# Patient Record
Sex: FEMALE | Race: WHITE | NOT HISPANIC OR LATINO | Employment: OTHER | URBAN - METROPOLITAN AREA
[De-identification: names, ages, dates, MRNs, and addresses within clinical notes are randomized per-mention and may not be internally consistent; named-entity substitution may affect disease eponyms.]

---

## 2021-04-22 ENCOUNTER — OFFICE VISIT (OUTPATIENT)
Dept: URGENT CARE | Facility: PHYSICIAN GROUP | Age: 41
End: 2021-04-22
Payer: MEDICARE

## 2021-04-22 VITALS
RESPIRATION RATE: 16 BRPM | SYSTOLIC BLOOD PRESSURE: 130 MMHG | OXYGEN SATURATION: 100 % | WEIGHT: 180 LBS | HEIGHT: 66 IN | BODY MASS INDEX: 28.93 KG/M2 | DIASTOLIC BLOOD PRESSURE: 72 MMHG | HEART RATE: 76 BPM | TEMPERATURE: 98.6 F

## 2021-04-22 DIAGNOSIS — G89.29 CHRONIC ABDOMINAL PAIN: ICD-10-CM

## 2021-04-22 DIAGNOSIS — J01.40 ACUTE NON-RECURRENT PANSINUSITIS: ICD-10-CM

## 2021-04-22 DIAGNOSIS — R10.9 CHRONIC ABDOMINAL PAIN: ICD-10-CM

## 2021-04-22 DIAGNOSIS — J30.2 CHRONIC SEASONAL ALLERGIC RHINITIS: ICD-10-CM

## 2021-04-22 PROBLEM — F33.42 RECURRENT MAJOR DEPRESSIVE DISORDER, IN FULL REMISSION (HCC): Status: ACTIVE | Noted: 2017-07-10

## 2021-04-22 PROBLEM — R68.89 EXERCISE INTOLERANCE: Status: ACTIVE | Noted: 2018-05-09

## 2021-04-22 PROBLEM — K22.10 EROSIVE ESOPHAGITIS: Status: ACTIVE | Noted: 2017-07-10

## 2021-04-22 PROBLEM — H04.123 TEAR FILM INSUFFICIENCY, BILATERAL: Status: ACTIVE | Noted: 2017-10-03

## 2021-04-22 PROBLEM — Z87.42 HISTORY OF ENDOMETRIOSIS: Status: ACTIVE | Noted: 2018-04-08

## 2021-04-22 PROBLEM — G47.09 OTHER INSOMNIA: Status: ACTIVE | Noted: 2018-03-26

## 2021-04-22 PROBLEM — R53.83 FATIGUE: Status: ACTIVE | Noted: 2017-07-26

## 2021-04-22 PROBLEM — R13.19 ESOPHAGEAL DYSPHAGIA: Status: ACTIVE | Noted: 2018-05-09

## 2021-04-22 PROBLEM — R19.8 ABNORMAL DEFECATION: Status: ACTIVE | Noted: 2018-05-09

## 2021-04-22 PROBLEM — Z90.710 ACQUIRED ABSENCE OF BOTH CERVIX AND UTERUS: Status: ACTIVE | Noted: 2018-04-03

## 2021-04-22 PROBLEM — F41.9 ANXIETY: Status: ACTIVE | Noted: 2017-07-10

## 2021-04-22 PROBLEM — M26.609 TMJ (TEMPOROMANDIBULAR JOINT SYNDROME): Status: ACTIVE | Noted: 2017-07-10

## 2021-04-22 PROBLEM — F32.A DEPRESSION: Status: ACTIVE | Noted: 2017-07-26

## 2021-04-22 PROBLEM — R30.0 DYSURIA: Status: ACTIVE | Noted: 2018-05-04

## 2021-04-22 PROCEDURE — 99204 OFFICE O/P NEW MOD 45 MIN: CPT | Performed by: PHYSICIAN ASSISTANT

## 2021-04-22 RX ORDER — ESTRADIOL 10 UG/1
10 INSERT VAGINAL
COMMUNITY
Start: 2021-02-24

## 2021-04-22 RX ORDER — AMOXICILLIN AND CLAVULANATE POTASSIUM 875; 125 MG/1; MG/1
1 TABLET, FILM COATED ORAL 2 TIMES DAILY
Qty: 14 TABLET | Refills: 0 | Status: SHIPPED | OUTPATIENT
Start: 2021-04-22 | End: 2021-04-29

## 2021-04-22 RX ORDER — PREGABALIN 100 MG/1
100 CAPSULE ORAL
COMMUNITY
Start: 2021-03-02

## 2021-04-22 RX ORDER — ESCITALOPRAM OXALATE 20 MG/1
20 TABLET ORAL
COMMUNITY
Start: 2020-12-16 | End: 2021-11-09

## 2021-04-22 ASSESSMENT — ENCOUNTER SYMPTOMS
HOARSE VOICE: 1
SORE THROAT: 1
COUGH: 1
SINUS PRESSURE: 1

## 2021-04-22 NOTE — PROGRESS NOTES
Subjective:   Elizabeth Tillman is a 40 y.o. female who presents for Sinus Problem (sore throat, and alleriges just moved here)        Sinusitis  This is a new problem. Episode onset: 1 week  There has been no fever. Associated symptoms include congestion, coughing, ear pain, a hoarse voice, sinus pressure and a sore throat.     The patient recently moved to [place].  She has not been able to establish with a primary care provider.  She does have a history of seasonal allergies.  But they have worsened since moving here.    She does still has a history of chronic abdominal pain.  She would like a referral to establish with a new GI in the area.    Review of Systems   HENT: Positive for congestion, ear pain, hoarse voice, sinus pressure and sore throat.    Respiratory: Positive for cough.        PMH:  has a past medical history of Anxiety, ASTHMA, Depression, Endometriosis, Hypertension, Pulmonary HTN (HCC) (6/11/2012), and Uterine cancer (HCC).  MEDS:   Current Outpatient Medications:   •  amoxicillin-clavulanate (AUGMENTIN) 875-125 MG Tab, Take 1 tablet by mouth 2 times a day for 7 days., Disp: 14 tablet, Rfl: 0  •  estradiol (VAGIFEM) 10 MCG Tab, Insert 10 mcg into the vagina., Disp: , Rfl:   •  escitalopram (LEXAPRO) 20 MG tablet, Take 20 mg by mouth., Disp: , Rfl:   •  norethindrone (AYGESTIN) 5 MG tablet, Take 5 mg by mouth every day., Disp: , Rfl:   •  dronabinol (MARINOL) 2.5 MG CAPS, Take 2.5 mg by mouth 2 times a day., Disp: , Rfl:   •  tramadol (ULTRAM) 50 MG TABS, Take  mg by mouth every four hours as needed., Disp: , Rfl:   •  promethazine (PHENERGAN) 25 MG TABS, Take 12.5 mg by mouth every 6 hours as needed., Disp: , Rfl:   •  NON SPECIFIED, Estrodial, 1mg, oral, daily, Disp: , Rfl:   •  PROGESTERONE, Take 500 mg by mouth every day., Disp: , Rfl:   •  pantoprazole (PROTONIX) 40 MG TBEC, Take 40 mg by mouth every day., Disp: , Rfl:   •  promethazine (PHENERGAN) 25 MG SUPP, Insert 1 Suppository in rectum  "every 6 hours as needed for Nausea/Vomiting., Disp: 10 Suppository, Rfl: 0  •  pregabalin (LYRICA) 100 MG Cap, Take 100 mg by mouth., Disp: , Rfl:   •  gabapentin (NEURONTIN) 100 MG CAPS, Take 100 mg by mouth 3 times a day., Disp: , Rfl:   •  lorazepam (ATIVAN) 1 MG TABS, Take 1 mg by mouth every four hours as needed., Disp: , Rfl:   •  ondansetron (ZOFRAN) 4 MG TABS, Take 1 Tab by mouth every four hours as needed for Nausea/Vomiting. (Patient not taking: Reported on 4/22/2021), Disp: 10 Tab, Rfl: 0  •  Sertraline HCl (ZOLOFT PO), Take  by mouth., Disp: , Rfl:   •  ondansetron (ZOFRAN) 4 MG TABS, Take 1 Tab by mouth every four hours as needed for Nausea/Vomiting. (Patient not taking: Reported on 4/22/2021), Disp: 20 Tab, Rfl: 1  •  nitrofurantoin monohydr macro (MACROBID) 100 MG CAPS, Take 100 mg by mouth 2 times a day., Disp: , Rfl:   •  ondansetron (ZOFRAN ODT) 4 MG TBDP, Take 1 Tab by mouth every 6 hours as needed for Nausea/Vomiting. (Patient not taking: Reported on 4/22/2021), Disp: 60 Each, Rfl: 1  ALLERGIES: No Known Allergies  SURGHX:   Past Surgical History:   Procedure Laterality Date   • HYSTERECTOMY, TOTAL ABDOMINAL     • OTHER ABDOMINAL SURGERY     • TONSILLECTOMY       SOCHX:  reports that she has never smoked. She has never used smokeless tobacco. She reports current alcohol use. She reports current drug use.  No family history on file.     Objective:   /72   Pulse 76   Temp 37 °C (98.6 °F) (Temporal)   Resp 16   Ht 1.676 m (5' 6\")   Wt 81.6 kg (180 lb)   SpO2 100%   BMI 29.05 kg/m²     Physical Exam  Vitals reviewed.   Constitutional:       General: She is not in acute distress.     Appearance: She is well-developed.   HENT:      Head: Normocephalic and atraumatic.      Right Ear: Tympanic membrane and external ear normal.      Left Ear: Tympanic membrane and external ear normal.      Nose: Congestion present.      Mouth/Throat:      Mouth: Mucous membranes are moist.      Pharynx: No " oropharyngeal exudate or posterior oropharyngeal erythema.   Eyes:      Conjunctiva/sclera: Conjunctivae normal.      Pupils: Pupils are equal, round, and reactive to light.   Neck:      Trachea: No tracheal deviation.   Cardiovascular:      Rate and Rhythm: Normal rate and regular rhythm.   Pulmonary:      Effort: Pulmonary effort is normal. No respiratory distress.      Breath sounds: Normal breath sounds. No stridor. No wheezing or rhonchi.   Musculoskeletal:      Cervical back: Normal range of motion and neck supple.   Skin:     General: Skin is warm and dry.      Capillary Refill: Capillary refill takes less than 2 seconds.   Neurological:      General: No focal deficit present.      Mental Status: She is alert and oriented to person, place, and time.   Psychiatric:         Mood and Affect: Mood normal.         Behavior: Behavior normal.           Assessment/Plan:     1. Acute non-recurrent pansinusitis  amoxicillin-clavulanate (AUGMENTIN) 875-125 MG Tab    AMB REFERRAL TO ESTABLISH WITH [facility]   2. Chronic abdominal pain  REFERRAL TO GASTROENTEROLOGY   3. Chronic seasonal allergic rhinitis       Supportive care reviewed. A referral was placed to establish with pcp here in [place].     If symptoms worsen or persist patient can return to clinic for reevaluation. Side effects of medication discussed. Patient confirmed understanding of information.    Please note that this dictation was created using voice recognition software. I have made every reasonable attempt to correct obvious errors, but I expect that there are errors of grammar and possibly content that I did not discover before finalizing the note.

## 2021-06-15 ENCOUNTER — TELEPHONE (OUTPATIENT)
Dept: OBGYN | Facility: CLINIC | Age: 41
End: 2021-06-15

## 2021-08-23 ENCOUNTER — APPOINTMENT (OUTPATIENT)
Dept: BEHAVIORAL HEALTH | Facility: CLINIC | Age: 41
End: 2021-08-23
Payer: MEDICARE

## 2021-08-27 ENCOUNTER — OFFICE VISIT (OUTPATIENT)
Dept: BEHAVIORAL HEALTH | Facility: CLINIC | Age: 41
End: 2021-08-27
Payer: MEDICARE

## 2021-08-27 DIAGNOSIS — F41.1 GENERALIZED ANXIETY DISORDER: ICD-10-CM

## 2021-08-27 DIAGNOSIS — F33.1 MODERATE EPISODE OF RECURRENT MAJOR DEPRESSIVE DISORDER (HCC): ICD-10-CM

## 2021-08-27 PROCEDURE — 99214 OFFICE O/P EST MOD 30 MIN: CPT | Performed by: PSYCHIATRY & NEUROLOGY

## 2021-08-27 RX ORDER — DULOXETIN HYDROCHLORIDE 30 MG/1
CAPSULE, DELAYED RELEASE ORAL
Qty: 53 CAPSULE | Refills: 1 | Status: SHIPPED | OUTPATIENT
Start: 2021-08-27 | End: 2021-09-26

## 2021-08-27 RX ORDER — ESCITALOPRAM OXALATE 10 MG/1
10 TABLET ORAL DAILY
Qty: 7 TABLET | Refills: 0 | Status: SHIPPED | OUTPATIENT
Start: 2021-08-27 | End: 2021-11-09

## 2021-08-27 ASSESSMENT — PATIENT HEALTH QUESTIONNAIRE - PHQ9
5. POOR APPETITE OR OVEREATING: 3 - NEARLY EVERY DAY
CLINICAL INTERPRETATION OF PHQ2 SCORE: 5
SUM OF ALL RESPONSES TO PHQ QUESTIONS 1-9: 20

## 2021-08-27 ASSESSMENT — ANXIETY QUESTIONNAIRES
5. BEING SO RESTLESS THAT IT IS HARD TO SIT STILL: SEVERAL DAYS
2. NOT BEING ABLE TO STOP OR CONTROL WORRYING: NEARLY EVERY DAY
4. TROUBLE RELAXING: NEARLY EVERY DAY
6. BECOMING EASILY ANNOYED OR IRRITABLE: MORE THAN HALF THE DAYS
GAD7 TOTAL SCORE: 17
IF YOU CHECKED OFF ANY PROBLEMS ON THIS QUESTIONNAIRE, HOW DIFFICULT HAVE THESE PROBLEMS MADE IT FOR YOU TO DO YOUR WORK, TAKE CARE OF THINGS AT HOME, OR GET ALONG WITH OTHER PEOPLE: VERY DIFFICULT
1. FEELING NERVOUS, ANXIOUS, OR ON EDGE: MORE THAN HALF THE DAYS
7. FEELING AFRAID AS IF SOMETHING AWFUL MIGHT HAPPEN: NEARLY EVERY DAY
3. WORRYING TOO MUCH ABOUT DIFFERENT THINGS: NEARLY EVERY DAY

## 2021-08-27 NOTE — PATIENT INSTRUCTIONS
Decrease Lexapro 10mg daily for one week, then discontinue  Start Cymbalta 30mg daily for one week, then increase to 60mg

## 2021-08-27 NOTE — PROGRESS NOTES
"  INITIAL PSYCHIATRIC EVALUATION      This provider informed the patient their medical records are totally confidential except for the use by other providers involved in their care, or if the patient signs a release, or to report instances of child or elder abuse, or if it is determined they are an immediate risk to harm themselves or others.      CHIEF COMPLAINT  \"Things have been horrible\"      HISTORY OF PRESENT ILLNESS  Elizabeth Tillman is a 40 y.o. old female comes in today to establish care and for evaluation of mood and anxiety.  Patient has a history of endometriosis with endometrial cancer, erosive esophagitis, Crohn's Disease, sialoadenitis, and IgG4 elevation presenting to establish care. Patient moved from [place] in March of 2020 following the end of an 11 year relationship. She states she has had persistent depressive mood since this time with worsening of symptoms. She notes anhedonia, poor sleep, poor energy, poor appetite. She identifies difficulty engaging in activities throughout the day and feeling as though she wants to sleep throughout the day. She states thoughts of wishing she were dead daily without plan or intent. Patient states she is presenting for care as she is hoping to have an improvement in symptoms and is seeking additional care. Patient has been living with her mother since moving back to this area and notes distress in relation to conflict. She has also had increased difficulty with transitioning medical care and transition to disability services.   She notes increased anxiety with periods of excessive worry. She has difficulty relaxing with increased feelings of being on edge. She has periods with dyspnea and palpitations in relation to elevated anxiety.     PSYCHIATRIC REVIEW OF SYSTEMS: denies manic symptoms, denies psychotic symptoms including AH / VH, denies OCD symptoms and denies restrictive eating or purging      MEDICAL REVIEW OF SYSTEMS:   Constitutional positive - " fatigue   Eyes negative   Ears/Nose/Mouth/Throat positive - submandibular swelling    Cardiovascular negative   Respiratory negative   Gastrointestinal positive - diarrhea, abdominal pain    Genitourinary negative   Muscular positive - joint pain right hip    Integumentary negative   Neurological negative   Endocrine negative   Hematologic/Lymphatic negative     CURRENT MEDICATIONS:  Current Outpatient Medications   Medication Sig Dispense Refill   • pregabalin (LYRICA) 100 MG Cap Take 100 mg by mouth.     • estradiol (VAGIFEM) 10 MCG Tab Insert 10 mcg into the vagina.     • escitalopram (LEXAPRO) 20 MG tablet Take 20 mg by mouth.     • gabapentin (NEURONTIN) 100 MG CAPS Take 100 mg by mouth 3 times a day.     • norethindrone (AYGESTIN) 5 MG tablet Take 5 mg by mouth every day.     • dronabinol (MARINOL) 2.5 MG CAPS Take 2.5 mg by mouth 2 times a day.     • tramadol (ULTRAM) 50 MG TABS Take  mg by mouth every four hours as needed.     • lorazepam (ATIVAN) 1 MG TABS Take 1 mg by mouth every four hours as needed.     • ondansetron (ZOFRAN) 4 MG TABS Take 1 Tab by mouth every four hours as needed for Nausea/Vomiting. (Patient not taking: Reported on 4/22/2021) 10 Tab 0   • promethazine (PHENERGAN) 25 MG TABS Take 12.5 mg by mouth every 6 hours as needed.     • NON SPECIFIED Estrodial, 1mg, oral, daily     • PROGESTERONE Take 500 mg by mouth every day.     • pantoprazole (PROTONIX) 40 MG TBEC Take 40 mg by mouth every day.     • Sertraline HCl (ZOLOFT PO) Take  by mouth.     • ondansetron (ZOFRAN) 4 MG TABS Take 1 Tab by mouth every four hours as needed for Nausea/Vomiting. (Patient not taking: Reported on 4/22/2021) 20 Tab 1   • promethazine (PHENERGAN) 25 MG SUPP Insert 1 Suppository in rectum every 6 hours as needed for Nausea/Vomiting. 10 Suppository 0   • nitrofurantoin monohydr macro (MACROBID) 100 MG CAPS Take 100 mg by mouth 2 times a day.     • ondansetron (ZOFRAN ODT) 4 MG TBDP Take 1 Tab by mouth every  6 hours as needed for Nausea/Vomiting. (Patient not taking: Reported on 4/22/2021) 60 Each 1     No current facility-administered medications for this visit.       ALLERGIES:  Patient has no known allergies.    PAST PSYCHIATRIC HISTORY  Prior psychiatric hospitalization: denies   Prior Self harm/suicide attempt: denies   Prior Diagnosis: Depression     PAST PSYCHIATRIC MEDICATIONS  • Zoloft  • Wellbutrin      FAMILY HISTORY  Psychiatric diagnosis:  Maternal borderline personality disorder     SUBSTANCE USE HISTORY:  ALCOHOL: states frequent use to aid with anxiety and depressed mood   CANNABIS: frequent use - [employer] 28% THC and 10%CBD    SOCIAL HISTORY  Childhood: Patient grew up in the [facility] and notes difficult childhood and strained relationship with her mother   Education: graduate degree   Employment: currently disability   Current living situation: lives with mother and stepfather     MEDICAL HISTORY  Past Medical History:   Diagnosis Date   • Anxiety    • ASTHMA    • Depression    • Endometriosis     s/p extensive endometrioma resection per [facility]   • Hypertension    • Pulmonary HTN (HCC) 6/11/2012   • Uterine cancer (HCC)      Past Surgical History:   Procedure Laterality Date   • HYSTERECTOMY, TOTAL ABDOMINAL     • OTHER ABDOMINAL SURGERY     • TONSILLECTOMY           PHYSICAL EXAMINAION:  Vital signs: There were no vitals taken for this visit.  Musculoskeletal: Normal gait.   Abnormal movements: no abnormal movements noted     MENTAL STATUS EXAMINATION      General:   - Grooming and hygiene: Casual and Neat,   - Apparent distress: no apparent distress noted ,   - Behavior: Calm  - Eye Contact:  Good,   - no psychomotor agitation or retardation    - Participation: Active verbal participation  Orientation: Alert and Fully Oriented to person, place and time  Mood: Depressed and Anxious  Affect: Constricted and Anxious,  Thought Process: Logical and Goal-directed  Thought Content: Denies suicidal  or homicidal ideations, intent or plan Within normal limits  Perception: Denies auditory or visual hallucinations. No delusions noted Within normal limits  Attention span and concentration: Intact   Speech:Rate within normal limits and Volume within normal limits  Language: Appropriate   Insight: Good  Judgment: Good  Recent and remote memory: No gross evidence of memory deficits      DEPRESSION SCREENING:  No flowsheet data found.    Interpretation of PHQ-9 Total Score   Score Severity   1-4 No Depression   5-9 Mild Depression   10-14 Moderate Depression   15-19 Moderately Severe Depression   20-27 Severe Depression      SAFETY ASSESSMENT - SELF:    Does patient acknowledge current or past symptoms of dangerousness to self? thoughts of wishing she were dead without plan or intent   Recent change in amount/specificity/intensity of suicidal thoughts or self-harm behavior? no  Current access to firearms, medications, or other identified means of suicide/self-harm? no  If yes, willing to restrict access to means of suicide/self-harm?   Protective factors present: patient states dogs are protective factor, desire to improve symptoms      SAFETY ASSESSMENT - OTHERS:    Does patient acknowledge current or past symptoms of aggressive behavior or risk to others? no  Recent change in amount/specificity/intensity of thoughts or threats to harm others? no  Current access to firearms/other identified means of harm?   If yes, willing to restrict access to weapons/means of harm?        CURRENT RISK:       Suicidal: Low       Homicidal: Low       Self-Harm: Low       Relapse: Not applicable       Crisis Safety Plan Reviewed Not Indicated    MEDICAL RECORDS/LABS/DIAGNOSTIC TESTS REVIEWED:  Reviewed       [facility] records -   Reviewed, appropriate       ASSESSMENT  Elizabeth Tillman is a 40 y.o. old female presenting to [facility]. Patient has history of chronic illness with impact on mood and anxiety. She has been having worsening  symptoms of depression with associated neurovegetative symptoms since returning to [place] as well as increasing anxiety. She expresses desire to initiate therapy at this time for further therapeutic interventions as well as medication adjustments due to decreased effectiveness of Lexapro. Reviewed with patient trial of Cymbalta to aid with symptoms. Patient believes she may have tried Cymbalta several years ago but states she cannot recall if it had impact on her liver enzymes. Will have CMP done prior to follow up for monitoring.       DIFFERENTIAL DIAGNOSES  1. Major Depressive Disorder, recurrent, moderate   2. Generalized Anxiety Disorder       PLAN:  • Decrease Lexapro 10mg PO daily for one week, then discontinue  • Start Cymbalta 30mg PO daily for one week, then increase to 60mg PO daily   • I reviewed clinical lab tests done in last 1 year. Patient will need following lab test done: CMP, TSH   • Medication options, alternatives (including no medications) and medication risks/benefits/side effects were discussed in detail.  • The patient was advised to call, message provider on [employer], or come in to the clinic if symptoms worsen or if any future questions/issues regarding their medications arise; the patient verbalized understanding and agreement.    • The patient was educated to call 911, call the suicide hotline, or go to local ER if having thoughts of suicide or homicide; verbalized understanding.        Return to clinic in 3 weeks or sooner if symptoms worsen.  Next Appointment:  instruction provided on how to make the next appointment.     The proposed treatment plan was discussed with the patient who was provided the opportunity to ask questions and make suggestions regarding alternative treatment. Patient verbalized understanding and expressed agreement with the plan.     Thank you for allowing me to participate in the care of this patient.    Daily Chisholm D.O.  08/27/21    CC:   Elena Patiño  CATY.

## 2021-10-06 ENCOUNTER — HOSPITAL ENCOUNTER (OUTPATIENT)
Dept: RADIOLOGY | Facility: MEDICAL CENTER | Age: 41
End: 2021-10-06
Attending: OTOLARYNGOLOGY
Payer: MEDICARE

## 2021-10-06 DIAGNOSIS — R13.14 PHARYNGOESOPHAGEAL DYSPHAGIA: ICD-10-CM

## 2021-10-06 DIAGNOSIS — R13.12 OROPHARYNGEAL DYSPHAGIA: ICD-10-CM

## 2021-10-06 PROCEDURE — 92611 MOTION FLUOROSCOPY/SWALLOW: CPT

## 2021-10-06 PROCEDURE — 74230 X-RAY XM SWLNG FUNCJ C+: CPT

## 2021-10-13 ENCOUNTER — OFFICE VISIT (OUTPATIENT)
Dept: BEHAVIORAL HEALTH | Facility: CLINIC | Age: 41
End: 2021-10-13
Payer: MEDICARE

## 2021-10-13 DIAGNOSIS — F41.1 GAD (GENERALIZED ANXIETY DISORDER): ICD-10-CM

## 2021-10-13 DIAGNOSIS — F33.0 MILD EPISODE OF RECURRENT MAJOR DEPRESSIVE DISORDER (HCC): ICD-10-CM

## 2021-10-13 PROCEDURE — 90791 PSYCH DIAGNOSTIC EVALUATION: CPT | Performed by: PSYCHOLOGIST

## 2021-10-13 NOTE — PROGRESS NOTES
..[facility]  PSYCHOTHERAPY INITIAL ASSESSMENT    This evaluation was conducted face to face at the [facility] office. Therapist reviewed limits of confidentiality and informed consent. Therapist discussed risks/benefits and expectations for services. Patient provided verbal consent for psychotherapy services.       Name: Elizabeth Tillman  MRN: [id number]  : 1980  Age: 41 y.o.  Date of assessment: 10/13/2021  PCP: Manny Guthrie M.D.  Persons in attendance: Patient  Total session time: 45minutes        CHIEF COMPLAINT AND HISTORY OF PRESENTING PROBLEM:    Elizabeth Tillman is a 41 y.o., female referred for assessment by Daily Chisholm D.*. She has seen Dr. Marj hill to get established with medication management. She is struggling with symptoms of depression, anxiety and has diagnoses of MDD and ROLY.  Problems with anxiety were probably present her entire life. She was never treated for it prior to her getting sick/physical problems in . Depression started in about  with the onset of her illness too. As a result, she has continued to have multiple chronic medical conditions which contribute to her depression and anxiety.  She explained that her main diagnosis started with endometrial cancer, then a guru effect occurred with adenomyosis, uterus was removed, one ovary was cancer, lost 62% of her bladder, has a mesh, lost a couple inches of her colon and feet of her large intestine. She also reported having osteoporosis. Has chronic pain as a result, but doesn't do pain management.  She said she uses alcohol instead. 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     Her mental health symptoms include persistent depressive mood, anhedonia, poor sleep, poor energy, poor appetite. She identifies difficulty engaging in activities throughout the day and feeling as though she wants to sleep throughout the day.       BEHAVIORAL HEALTH TREATMENT HISTORY     [] Patient denies any  prior mental health treatment or history    Does patient report a history of prior behavioral health treatment? yes  When and what for?   Former Diagnoses:       CURRENT RELEVANT MEDICATIONS        FAMILY/SOCIAL HISTORY    Marital Status:  Single  # Of Children:None  Current living situation/household members:  Lives with her mother and step-dad  Pets:  3 dogs   Family of origin history / siblings:  Has a brother  Quality/quantity of current family support: her bio dad is her best friend  Quality/quantity of other support:       Family History of mental health issues? Yes  Who and what type:  Mother's side has mental health issues    ACUTE OR CHRONIC STRESSORS:  Broke up after 11 years with her boyfriend in 2020. Signed off on her house, split the pets.       NUTRITION ISSUES    Does patient report any current nutritional concerns? Yes  Does patient report change in appetite or weight loss/gain? Yes  Does patient report history of eating disorder symptoms? No. She has issues due to her medical issues.      PAIN ISSUES  Does patient report physical pain? Yes  Indicate if pain is acute or chronic, and location: chronic due to medical issues  Pain scale rating:       Does patient/parent report functional impairment from medical, developmental, or pain issues?   yes    Primary Care Behavioral Health Screenings Given? No. Was unable to do this due to time constraints.      EDUCATIONAL/LEARNING HISTORY    Does patient report any history of current developmental concerns or Special Education ? No  Did the patient graduate high school? Yes  If not last grade attended:  Did the patient attend college? Yes   Did the patient Graduate College? Yes Degree Received:  Multi-media and broadcast journalism.   Is patient currently attending a school or program?       EMPLOYMENT/RESOURCES    Is the patient currently employed? 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   Does the patient/parent report adequate  financial resources? Yes  Does patient identify impact of presenting issue on work functioning? Yes  Work or income-related stressors:    Disabled?:   As of 2019 was on disability.     HISTORY:    [x] Patient denies having any  history    Does patient report current or past enlistment? No   Does patient report history of exposure to combat? No  Does patient report history of  trauma? No  Does patient report other -related stressors? No    SUBSTANCE USE/ADDICTION HISTORY    ALCOHOL    [] Patient denies the use of any alcohol    Does patient use alcohol:Yes  If yes how much?   Within the past week? Yes  Last time patient used alcohol: Uses alcohol every day and acknowledges that it is a coping strategy and a problem with her.  Does the patient feel alcohol use is a problem:Yes    SUBSTANCES    [] Patient denies the use of any illicit drugs    Does patient use illicit or street drugs?No   Illicit drug use in the past?No   Last illicit drug use:  Has substance use/dependence ever been a problem? Yes   Any use of prescription medications/pills without a prescription, or for reasons others than originally prescribed?  No    Marijuana or marijuana products? No  Last time patient used THC products:     Any other addictive behavior reported (gambling, shopping, sex)? No     Has the patient had any of the following consequences as a result of alcohol, drug or other substance? None    Is there a family history of substance use/addiction? Yes  If so who?    SMOKING    [x] Patient denies smoking cigarettes, vapes or pipes     Does patient smoke? Denied  How much?   Does patient use a vape?No  How much?  What type of vaping device?    SPIRITUAL/CULTURAL/IDENTITY:    What are the patient’s/family’s spiritual beliefs or practices?   What is the patient’s cultural or ethnic background/identity?   How does the patient identify their sexual orientation?   How does the patient identify their  gender?  Does the patient identify any spiritual/cultural/identity factors as relevant to the presenting issue? No    LEGAL HISTORY    [x] Patient denies any legal history.     Has the patient ever been involved with juvenile, adult, or family legal systems? No  Does patient report ever being a victim of a crime?  No  Does patient report involvement in any current legal issues?  No  Does patient report ever being arrested or committing a crime? No  Does patient report any current agency (parole/probation/CPS/) involvement? No    ABUSE/NEGLECT/TRAUMA SCREENING    [] Patient denies any prior abuse, neglect or trauma    Does patient report feeling “unsafe” in his/her home, or afraid of anyone? No  Does patient report any history of physical, sexual, or emotional abuse? Unknown  Does the patient report any history of CPS/APS/police involvement related to suspected abuse/neglect or domestic violence? No  Does the patient report any other history of potentially traumatic life events? Yes   Based on the information provided during the current assessment, is a mandated report of suspected abuse/neglect being made?  No     SAFETY ASSESSMENT - SELF    [] Patient denies ever having SI, past or present    Does patient acknowledge current or past symptoms of dangerousness to self? Yes    Recent change in frequency/specificity/intensity of suicidal thoughts or self-harm behavior? No  Current access to firearms, medications, or other identified means of suicide/self-harm? No  If yes, willing to restrict access to means of suicide/self-harm? N/A  Protective factors present: Future-oriented, Positive coping skills and Positive self-efficacy    Current Suicide Risk: Low  Crisis Safety Plan completed and copy given to patient: No    SAFETY ASSESSMENT - OTHERs    [x] Patient denies any hx of HI, past or present.     Current Homicide Risk:  Low  Crisis Safety Plan completed and copy given to patient? No  Based on  information provided during the current assessment, is a mandated “duty to warn” being exercised? No      HOBBIES/INTERESTS:             PATIENT EXPECTATION / GOALS FOR THERAPY          STRENGTHS/ASSETS    Strengths Identified by interviewer: Self-awareness and Family suppport  Strengths Identified by patient:       MENTAL STATUS/OBSERVATIONS:     Participation: Active verbal participation  Grooming: Casual  Orientation:Alert and Fully Oriented   Behavior: Calm  Eye contact: Good   Mood:Euthymic  Affect:Flexible  Thought process: Logical  Thought content:  Within normal limits  Speech: Rate within normal limits and Volume within normal limits  Perception: Within normal limits  Memory: No gross evidence of memory deficits  Insight: Adequate  Judgment:  Adequate  Other:       CLINICAL FORMULATION:   Elizabeth Tillman is a 41 y.o. year old female presenting to [facility] for symptoms related to depression and anxiety.  She has multiple chronic medical issues and acute stressors.  She experienced anxiety for many years, but started becoming more depressed after her first medical issues. She is single with three dogs and lives with her parents.  She reports nutritional issues related to her medical issues.  She is not working.  She does not use alcohol, tobacco, illicit drugs or marijuana, and has never had a problem with substances in the past. She would like some tools to help manage her chronic conditions, and the depression/anxiety that coincide.  Pt is Oriented x4, and mental status is WNL. There were no perceptual disturbance and pt. Denied AH/VH or delusions. Pt. Denied SI or HI, with no prior suicide attempts.  Patient has some positive coping strategies and a good support system.       DIAGNOSTIC IMPRESSION(S):  1. ROLY (generalized anxiety disorder)    2. Mild episode of recurrent major depressive disorder (HCC)         Does patient express agreement with the above plan? Yes     Referral or follow up  appointment(s) scheduled? Yes Patient given instructions on how to schedule further appointments. Please call [phone] to reschedule.        Raya Loya Psy.D  Licensed Psychologist

## 2021-10-26 ENCOUNTER — OFFICE VISIT (OUTPATIENT)
Dept: BEHAVIORAL HEALTH | Facility: CLINIC | Age: 41
End: 2021-10-26
Payer: MEDICARE

## 2021-10-26 DIAGNOSIS — F41.1 GENERALIZED ANXIETY DISORDER: ICD-10-CM

## 2021-10-26 DIAGNOSIS — F33.1 MODERATE EPISODE OF RECURRENT MAJOR DEPRESSIVE DISORDER (HCC): ICD-10-CM

## 2021-10-26 PROCEDURE — 90791 PSYCH DIAGNOSTIC EVALUATION: CPT | Performed by: PSYCHOLOGIST

## 2021-11-03 NOTE — PROGRESS NOTES
[facility]   Psychotherapy Progress Note        Name: Elizabeth Tillman  MRN: [id number]  : 1980  Age: 41 y.o.  Date of assessment: 11/3/2021  PCP: Manny Guthrie M.D.  Persons in attendance: Patient  Total session time: 54 minutes      Topics addressed in psychotherapy include: Today's session covered the topics stress, depression and pain.  Discussed her chronic medical issues and how they contribute to her depression and anxiety. Discussed how increased stress levels are related to pain levels.  Also discussed her current living situation and the stressors that are related to it.     Therapist provided validation, support and feedback.  Therapist also provided a new tool for patient to utilize. The patient was encouraged to utilize the tool often at home and other settings.  There was no SI.     Objective Observations:   Participation:Active verbal participation   Grooming:Casual   Cognition:Alert and Fully Oriented   Eye Contact:Good   Mood:Euthymic   Affect:Congruent with content   Thought Process:Logical and Goal-directed   Speech:Rate within normal limits and Volume within normal limits    Current Risk:   Suicide: low   Homicide: low   Self-Harm: low   Relapse: low   Safety Plan Needed: No    Care Plan Updated: No    Does patient express agreement with the above plan? Yes     Diagnosis:  1. Moderate episode of recurrent major depressive disorder (HCC)    2. Generalized anxiety disorder        Follow up appointment(s) scheduled? Yes       Raya Loya PsyD

## 2021-11-09 ENCOUNTER — OFFICE VISIT (OUTPATIENT)
Dept: BEHAVIORAL HEALTH | Facility: CLINIC | Age: 41
End: 2021-11-09
Payer: MEDICARE

## 2021-11-09 DIAGNOSIS — F33.1 MODERATE EPISODE OF RECURRENT MAJOR DEPRESSIVE DISORDER (HCC): ICD-10-CM

## 2021-11-09 DIAGNOSIS — Z79.899 ENCOUNTER FOR LONG-TERM (CURRENT) USE OF MEDICATIONS: ICD-10-CM

## 2021-11-09 PROCEDURE — 99214 OFFICE O/P EST MOD 30 MIN: CPT | Mod: GC | Performed by: PSYCHIATRY & NEUROLOGY

## 2021-11-09 RX ORDER — DULOXETIN HYDROCHLORIDE 30 MG/1
30 CAPSULE, DELAYED RELEASE ORAL DAILY
Qty: 30 CAPSULE | Refills: 2 | Status: SHIPPED | OUTPATIENT
Start: 2021-11-09

## 2021-11-09 RX ORDER — DULOXETIN HYDROCHLORIDE 60 MG/1
60 CAPSULE, DELAYED RELEASE ORAL DAILY
Qty: 30 CAPSULE | Refills: 2 | Status: SHIPPED | OUTPATIENT
Start: 2021-11-09

## 2021-11-09 NOTE — PROGRESS NOTES
"[facility]  PSYCHIATRIC FOLLOW-UP NOTE    Persons in attendance: Patient      Reason for visit / chief complaint:   41 year old female presenting for medication management. Patient was started on Cymbalta at last appointment with increase to 60mg.     \"I think things are the same\"       SUBJECTIVE / HPI:  Patient states that she has had continued depressed mood and anxiety since starting medication. She states minimal observable improvement in symptoms. Of note, she states stressors have continued to be present with her parents and health and this has been largely contributing to symptoms. She notes that she has been drinking a drink in the evening nightly consisting of approximately 2 ounces of hard liquor and sprite. She states concern about her liver enzymes increasing and monitoring for response to treatment. She denies side effects with use of medication. She feels that her pain has improved with use of medication. She states thoughts of wishing she were dead are present without plan or intent.         OBJECTIVE:    MSE :   Appearance: well groomed, appropriate    Behavior: calm, cooperative, pleasant, engaged. good eye contact.  No tics. No mannerisms.   Speech : normal rate, normal volume, normal tone   Language: normal vocabulary   Mood: about the same    Affect: euthymic   Thought Process: linear, coherent, goal-directed. No flight of ideas.  No loose associations   Thought Content: no auditory or visual hallucinations  and denies homicidal ideation, plan or intent; states suicidal thoughts without plan or intent   Attention/Concentration: appropriate    Memory: no gross deficits   Orientation: oriented to person, place, situation   Neurological: Deferred   Fund of Knowledge: appropriate   Insight/Judgment: good / good      No Known Allergies     PAST PSYCHIATRIC HISTORY  Prior psychiatric hospitalization: denies   Prior Self harm/suicide attempt: denies   Prior Diagnosis: Depression      PAST " PSYCHIATRIC MEDICATIONS  · Zoloft  · Wellbutrin       FAMILY HISTORY  Psychiatric diagnosis:  Maternal borderline personality disorder      SUBSTANCE USE HISTORY:  ALCOHOL: states frequent use to aid with anxiety and depressed mood   CANNABIS: frequent use - V[employer] Oil 28% THC and 10%CBD     SOCIAL HISTORY  Childhood: Patient grew up in the [facility] and notes difficult childhood and strained relationship with her mother   Education: graduate degree   Employment: currently disability   Current living situation: lives with mother and stepfather       Review of systems:        Constitutional negative   Eyes negative   Ears/Nose/Mouth/Throat negative   Cardiovascular negative   Respiratory negative   Gastrointestinal Diarrhea, abdominal pain    Genitourinary negative   Muscular Joint pain    Integumentary negative   Neurological negative   Endocrine negative   Hematologic/Lymphatic negative       Medical Records/Labs/Diagnostic Tests Reviewed: reviewed, no new labs         Current Outpatient Medications:   •  DULoxetine, 60 mg, Oral, DAILY  •  DULoxetine, 30 mg, Oral, DAILY  •  escitalopram, 10 mg, Oral, DAILY  •  pregabalin, Take 100 mg by mouth.  •  estradiol, Insert 10 mcg into the vagina.  •  norethindrone, 5 mg, Oral, DAILY  •  dronabinol, 2.5 mg, Oral, BID  •  pantoprazole, 40 mg, Oral, DAILY  •  promethazine, 25 mg, Rectal, Q6HRS PRN           ASSESSMENT:  41 year old female presenting for medication management. Patient has had continued depressed mood and increased anxiety. Discussed continuation of therapy and importance of this as stressors are present contributing to symptoms. Patient has tolerated medication well with some improvement in pain. Will increase dose for improved response. Discussed concerns with impact on liver of alcohol use and medication. Will obtain labs for monitoring.       DDX:  1. Moderate episode of recurrent major depressive disorder (HCC)  DULoxetine (CYMBALTA) 60 MG Cap   Particles delayed-release capsule    DULoxetine (CYMBALTA) 30 MG Cap DR Particles   2. Encounter for long-term (current) use of medications  CBC WITH DIFFERENTIAL    Comp Metabolic Panel    TSH WITH REFLEX TO FT4    Lipid Profile           PLAN:  -Increase Cymbalta 90mg PO daily after reviewing labs  -Medication options, alternatives (including no medications) and medication risks/benefits/side effects were discussed in detail.  -The patient was advised to call, message provider on [employer], or come in to the clinic if symptoms worsen or if any future questions/issues regarding their medications arise; the patient verbalized understanding and agreement.    -The patient was educated to call 911, call the suicide hotline, or go to local ER if having thoughts of suicide or homicide; verbalized understanding.            - Medical Records/Labs/Diagnostic Tests Ordered: CBC, CMP, TSH, Lipid profile       Daily Chisholm DO

## 2021-11-23 ENCOUNTER — HOSPITAL ENCOUNTER (OUTPATIENT)
Dept: RADIOLOGY | Facility: MEDICAL CENTER | Age: 41
End: 2021-11-23
Attending: INTERNAL MEDICINE
Payer: MEDICARE

## 2021-11-23 DIAGNOSIS — Z12.31 VISIT FOR SCREENING MAMMOGRAM: ICD-10-CM

## 2021-11-23 PROCEDURE — 77063 BREAST TOMOSYNTHESIS BI: CPT

## 2021-12-08 ENCOUNTER — APPOINTMENT (OUTPATIENT)
Dept: BEHAVIORAL HEALTH | Facility: CLINIC | Age: 41
End: 2021-12-08

## 2021-12-10 ENCOUNTER — HOSPITAL ENCOUNTER (OUTPATIENT)
Dept: RADIOLOGY | Facility: MEDICAL CENTER | Age: 41
End: 2021-12-10
Payer: COMMERCIAL

## 2021-12-13 ENCOUNTER — HOSPITAL ENCOUNTER (OUTPATIENT)
Dept: RADIOLOGY | Facility: MEDICAL CENTER | Age: 41
End: 2021-12-13
Payer: COMMERCIAL

## 2022-03-20 ENCOUNTER — HOSPITAL ENCOUNTER (EMERGENCY)
Facility: MEDICAL CENTER | Age: 42
End: 2022-03-20
Attending: EMERGENCY MEDICINE
Payer: COMMERCIAL

## 2022-03-20 ENCOUNTER — APPOINTMENT (OUTPATIENT)
Dept: RADIOLOGY | Facility: MEDICAL CENTER | Age: 42
End: 2022-03-20
Attending: EMERGENCY MEDICINE
Payer: COMMERCIAL

## 2022-03-20 VITALS
TEMPERATURE: 98.4 F | HEART RATE: 80 BPM | SYSTOLIC BLOOD PRESSURE: 132 MMHG | WEIGHT: 172.62 LBS | HEIGHT: 67 IN | OXYGEN SATURATION: 95 % | DIASTOLIC BLOOD PRESSURE: 80 MMHG | RESPIRATION RATE: 16 BRPM | BODY MASS INDEX: 27.09 KG/M2

## 2022-03-20 DIAGNOSIS — S61.011A LACERATION OF RIGHT THUMB WITHOUT FOREIGN BODY WITHOUT DAMAGE TO NAIL, INITIAL ENCOUNTER: ICD-10-CM

## 2022-03-20 DIAGNOSIS — M79.89 RIGHT LEG SWELLING: ICD-10-CM

## 2022-03-20 PROCEDURE — 304999 HCHG REPAIR-SIMPLE/INTERMED LEVEL 1

## 2022-03-20 PROCEDURE — 304217 HCHG IRRIGATION SYSTEM

## 2022-03-20 PROCEDURE — 93971 EXTREMITY STUDY: CPT | Mod: RT

## 2022-03-20 PROCEDURE — 90471 IMMUNIZATION ADMIN: CPT

## 2022-03-20 PROCEDURE — 90715 TDAP VACCINE 7 YRS/> IM: CPT | Performed by: EMERGENCY MEDICINE

## 2022-03-20 PROCEDURE — 303747 HCHG EXTRA SUTURE

## 2022-03-20 PROCEDURE — 700111 HCHG RX REV CODE 636 W/ 250 OVERRIDE (IP): Performed by: EMERGENCY MEDICINE

## 2022-03-20 PROCEDURE — 99283 EMERGENCY DEPT VISIT LOW MDM: CPT

## 2022-03-20 RX ADMIN — CLOSTRIDIUM TETANI TOXOID ANTIGEN (FORMALDEHYDE INACTIVATED), CORYNEBACTERIUM DIPHTHERIAE TOXOID ANTIGEN (FORMALDEHYDE INACTIVATED), BORDETELLA PERTUSSIS TOXOID ANTIGEN (GLUTARALDEHYDE INACTIVATED), BORDETELLA PERTUSSIS FILAMENTOUS HEMAGGLUTININ ANTIGEN (FORMALDEHYDE INACTIVATED), BORDETELLA PERTUSSIS PERTACTIN ANTIGEN, AND BORDETELLA PERTUSSIS FIMBRIAE 2/3 ANTIGEN 0.5 ML: 5; 2; 2.5; 5; 3; 5 INJECTION, SUSPENSION INTRAMUSCULAR at 18:52

## 2022-03-20 ASSESSMENT — LIFESTYLE VARIABLES
HAVE YOU EVER FELT YOU SHOULD CUT DOWN ON YOUR DRINKING: NO
EVER HAD A DRINK FIRST THING IN THE MORNING TO STEADY YOUR NERVES TO GET RID OF A HANGOVER: NO
AVERAGE NUMBER OF DAYS PER WEEK YOU HAVE A DRINK CONTAINING ALCOHOL: 0
DO YOU DRINK ALCOHOL: NO
HAVE PEOPLE ANNOYED YOU BY CRITICIZING YOUR DRINKING: NO
ON A TYPICAL DAY WHEN YOU DRINK ALCOHOL HOW MANY DRINKS DO YOU HAVE: 0
TOTAL SCORE: 0
EVER FELT BAD OR GUILTY ABOUT YOUR DRINKING: NO
TOTAL SCORE: 0
CONSUMPTION TOTAL: NEGATIVE
HOW MANY TIMES IN THE PAST YEAR HAVE YOU HAD 5 OR MORE DRINKS IN A DAY: 0
TOTAL SCORE: 0

## 2022-03-21 NOTE — DISCHARGE INSTRUCTIONS
Your stitches should be removed in 7 days. Return immediately to the emergency department at the first sign of infection including increasing redness, increasing pain, pus in the wound, fever or if you have any other concerns.

## 2022-03-21 NOTE — ED TRIAGE NOTES
"Chief Complaint   Patient presents with   • Digit Pain     PT states she cut her R anterior thumb. 1inch full thickness laceration present to R anterior thumb.   • Leg Pain     PT states she has been having swelling, cramping and pain to her R shin and ankle. Pt states she has had a blood clot in the RLE previously after a surgery. PT is concerned she has another DVT.       Ambulatory to triage for above complaint.   Educated on triage process, encourage to inform staff of any changes.     /68   Pulse 86   Temp 36.8 °C (98.3 °F) (Temporal)   Resp 16   Ht 1.702 m (5' 7\")   Wt 78.3 kg (172 lb 9.9 oz)   SpO2 94%   BMI 27.04 kg/m²     "

## 2022-03-21 NOTE — ED PROVIDER NOTES
ED Provider Note    CHIEF COMPLAINT  Chief Complaint   Patient presents with   • Digit Pain     PT states she cut her R anterior thumb. 1inch full thickness laceration present to R anterior thumb.   • Leg Pain     PT states she has been having swelling, cramping and pain to her R shin and ankle. Pt states she has had a blood clot in the RLE previously after a surgery. PT is concerned she has another DVT.       HPI  Elizabeth Tillman is a 41 y.o. female who presents for evaluation of 2 complaints.  First she has a laceration involving her right thumb, this was sustained on a sharp piece of metal.  No weakness or numbness.  No restriction in range of motion.  Tetanus is out of date.  Second she is experiencing right leg swelling and pain over the past couple days, she has been engaged in some home repairs but does not recall injuring her leg.  She is noted areas of bruising.  She does have a history of DVT no longer anticoagulated, this was status post surgery.  No chest pain or shortness of breath or fever, no other acute complaints.    REVIEW OF SYSTEMS  Negative for fever, rash, chest pain, dyspnea, abdominal pain, back pain. All other systems are negative.     PAST MEDICAL HISTORY   has a past medical history of Anxiety, ASTHMA, Depression, Endometriosis, Hypertension, Pulmonary HTN (HCC) (6/11/2012), and Uterine cancer (HCC).    SOCIAL HISTORY  Social History     Tobacco Use   • Smoking status: Never Smoker   • Smokeless tobacco: Never Used   Vaping Use   • Vaping Use: Never used   Substance and Sexual Activity   • Alcohol use: Yes   • Drug use: Yes     Comment: marijuana   • Sexual activity: Not on file       SURGICAL HISTORY   has a past surgical history that includes hysterectomy, total abdominal; tonsillectomy; and other abdominal surgery.    CURRENT MEDICATIONS  I personally reviewed the medication list in the charting documentation.     ALLERGIES  No Known Allergies    PHYSICAL EXAM  VITAL SIGNS: /68  "  Pulse 86   Temp 36.8 °C (98.3 °F) (Temporal)   Resp 16   Ht 1.702 m (5' 7\")   Wt 78.3 kg (172 lb 9.9 oz)   SpO2 94%   BMI 27.04 kg/m²   Constitutional: Well appearing patient in no acute distress.  Awake and alert, not toxic nor ill in appearance.  HENT: Normocephalic, no obvious evidence of acute trauma.   Neck: Comfortable movement without any obvious restriction in the range of motion.  Eyes: Conjunctiva normal, Non-icteric.   Chest: Normal nonlabored respirations.  Skin: The exposed portions of skin reveal no obvious rash or other abnormalities.  Musculoskeletal: Inspection of the right hand reveals a horizontally oriented subcutaneous linear laceration overlying the region of the proximal phalanx of the thumb on the extensor surface.  Neurovascularly intact distally, full range of motion even against resistance.  Inspection of the right leg reveals some mild asymmetric edema.  No calf tenderness.  Couple areas of yellowing bruising noted.  Neurovascularly intact distally.  Neurologic: Alert, No obvious focal deficits noted.   Psychiatric: Affect normal for clinical presentation    DIAGNOSTIC STUDIES / PROCEDURES    RADIOLOGY     US-EXTREMITY VENOUS LOWER UNILAT RIGHT   Final Result      Negative for DVT      Laceration Repair Procedure Note    Indication: Laceration    Procedure: The patient was placed in the appropriate position and anesthesia around the laceration was obtained by infiltration using 2% Lidocaine without epinephrine. The area was then irrigated with normal saline, explored with no foreign bodies discovered and draped in a sterile fashion. The laceration was closed with 5-0 Ethilon using interrupted sutures. There were no additional lacerations requiring repair.     Total repaired wound length: 2 cm.     Other Items: Suture count: 3    The patient tolerated the procedure well.    Complications: None          COURSE & MEDICAL DECISION MAKING  Pertinent Labs & Imaging studies reviewed. " (See chart for details)    Encounter Summary: This is a very pleasant 41 y.o. female who unfortunately required evaluation in the emergency department today with 2 issues, first she has a laceration of the right thumb, neurovascularly intact without evidence of structural injury.  Anesthetized, irrigated and repaired with sutures.  Her tetanus is updated here today.  Her second issue is of right leg swelling with a history of DVT, she has been engaged in some home repair activities, does not recall definitive injury however, a duplex study here is negative for DVT, I do suspect a musculoskeletal source of the swelling and pain.  She will use anti-inflammatories as needed.  Discharged home in stable condition with strict return instructions provided      DISPOSITION: Discharge Home      FINAL IMPRESSION  1. Laceration of right thumb without foreign body without damage to nail, initial encounter    2. Right leg swelling        This dictation was created using voice recognition software. The accuracy of the dictation is limited to the abilities of the software. I expect there may be some errors of grammar and possibly content. The nursing notes were reviewed and certain aspects of this information were incorporated into this note.    Electronically signed by: Tr Dong M.D., 3/20/2022 6:40 PM

## 2022-03-21 NOTE — ED NOTES
DC instructions reviewed with pt. Pt verbalized understanding. Pt ambulated to [facility] with steady gait.

## 2022-11-03 ENCOUNTER — PATIENT MESSAGE (OUTPATIENT)
Dept: HEALTH INFORMATION MANAGEMENT | Facility: OTHER | Age: 42
End: 2022-11-03

## 2023-05-04 NOTE — OP THERAPY EVALUATION
[facility]  Modified Barium Swallow Study          Patient Name: Elizabeth Tillman  Date of Evaluation: 10/6/21  Referring Provider: Clara Castaneda M.D.  Fax: [phone]        Patient History/Reason for Referral:  The pt was referred for a Modified Barium Swallow Study (MBSS) due to history of dysphagia and current dysphagia to all food textures. Pt has an extensive history of surgeries including removal of tonsils, adenoids, mastoid salivary glands, and submandibular glands and biopsy of uvula. She reported that she had a temporary NG feeding tube for about 3-6 months while admitted at [place].      Oral Mechanism Exam:  A formal oral motor evaluation was not conducted, however, no gross asymmetry appreciated. Slight hoarse vocal quality appreciated which is not baseline per patient report.    Subjective:  Patient arrived to evaluation on time and participated well.    Assessment:  Videofluoroscopic Swallow Study was conducted in the lateral projection to evaluate oropharyngeal swallow function. A radiology tech was present to assist with the procedure.    Positioning: seated upright in fluoroscopy chair  Bolus Administration: patient  PO barium contrast trials: Varibar thin liquid, Varibar pudding, solid coated in Varibar pudding, soft solids, mixed consistencies       Oral phase:  Premature spillage to the level of the vallecular space with thin liquids, puree, soft solids, and solid textures. Spillage to the level of the pyriform sinuses appreciated with mixed consistencies. Oral residue after the swallow noted with all textures with all textures which cleared with volitional swallow. Slightly prolonged mastication of solids, but more timely with soft solids.    Pharyngeal phase:  High penetration during the swallow noted intermittently with thin liquids, but not appreciated with any other texture. Trace-mild pharyngeal residue after the swallow which cleared with reflexive and volitional  second swallow. She c/o globus sensation which improved with second swallow or thin liquid wash. No aspiration with any texture.        Penetration Aspiration Scale:  1     No contrast enters airway  2     Contrast enters the airway, remains above the vocal folds, and is ejected from the airway (not seen in the airway at the end of the swallow).  3     Contrast enters the airway, remains above the vocal folds, and is not ejected from the airway (is seen in the airway after the swallow).  4     Contrast enters the airway, contacts the vocal folds, and is ejected from the airway.  5     Contrast enters the airway, contacts the vocal folds, and is not ejected from the airway  6     Contrast enters the airway, crosses the plane of the vocal folds, and is ejected from the airway.  7     Contrast enters the airway, crosses the plane of the vocal folds, and is not ejected from the airway despite effort.  8     Contrast enters the airway, crosses the plane of the vocal folds, is not ejected from the airway and there is no response to aspiration.      Consistency PAS Score Timing  Thin Liquid 2 During swallow  Pudding N/A N/A  Solid N/A N/A      Esophageal phase:  Mild retention of contrast below the level of the UES. It does pass after mild stasis. Pt has history of reflux/GERD.        Clinical Impressions:  The pt presents with a functional oropharyngeal swallow. She does have trace pharyngeal residue after the swallow which clears with reflexive/volitional second swallow or liquid wash. Pt demonstrated an effortful swallow which may contribute to excessive consumption of air and patient endorsed frequent belching especially after meal times.         Recommendations:  Recommend continuation of regular/thin liquid diet. Pt endorsed that it was easier to swallow soft solids/mixed consistencies so may recommend choosing foods that she feels more comfortable eating. She may also benefit from implementation of double swallow  compensatory strategy and/or liquid wash after solid textures to eliminate pharyngeal residue.            Thank you for the referral. For further questions, please call [phone].  Ethel Cao, SLP     improved posture with cues/impaired balance/impaired postural control/impaired sensory feedback